# Patient Record
(demographics unavailable — no encounter records)

---

## 2024-12-05 NOTE — HISTORY OF PRESENT ILLNESS
[Patient reported bone density results were abnormal] : Patient reported bone density results were abnormal [HIV test declined] : HIV test declined [Syphilis test declined] : Syphilis test declined [Gonorrhea test declined] : Gonorrhea test declined [Chlamydia test declined] : Chlamydia test declined [Trichomonas test declined] : Trichomonas test declined [HPV test offered] : HPV test offered [Hepatitis B test declined] : Hepatitis B test declined [Hepatitis C test declined] : Hepatitis C test declined [postmenopausal] : postmenopausal [Y] : Positive pregnancy history [TextBox_4] : 68 yo  for well woman exam.  Sexually active.  Former smoker and occasional use of marijuana.  No hx abnormal pap smears, abnormal bleeding, fibroids, cysts.  No urinary complaints.  Past medical, surgical, social and family hx reviewed. Recently dx with osteoporosis - tried Prolia but got dry socket, has follow up appt to try new medication. Hx sleep apnea. MGM with ovarian cancer after menopause.  No other reproductive cancers in family [PapSmeardate] : 2/23 [BoneDensityDate] : 2024 [TextBox_37] : osteoporosis [ColonoscopyDate] : 2022 [PGHxTotal] : 2 [[facility] [[facility]] : 2 none

## 2024-12-05 NOTE — DISCUSSION/SUMMARY
[FreeTextEntry1] : Unremarkable CBE and SBE reviewed Mammogram/DEXA completed this year Pelvic exam unremarkable Pap/HPV collected I reviewed practices to support bone health including Vitamin D3 (2000 IU) and calcium rich foods with limitation on calcium supplementation by tabular form to 600 MG by mouth daily, a minimum of 30 minutes of weight bearing exercise at least 3 x weekly and limited daily sun exposure, 10-15 minutes.  Healthy diet, exercise and sleep hygiene discussed The importance of a screening colonoscopy was discussed and she is up to date.

## 2024-12-05 NOTE — HISTORY OF PRESENT ILLNESS
[Patient reported bone density results were abnormal] : Patient reported bone density results were abnormal [HIV test declined] : HIV test declined [Syphilis test declined] : Syphilis test declined [Gonorrhea test declined] : Gonorrhea test declined [Chlamydia test declined] : Chlamydia test declined [Trichomonas test declined] : Trichomonas test declined [HPV test offered] : HPV test offered [Hepatitis B test declined] : Hepatitis B test declined [Hepatitis C test declined] : Hepatitis C test declined [postmenopausal] : postmenopausal [Y] : Positive pregnancy history [TextBox_4] : 68 yo  for well woman exam.  Sexually active.  Former smoker and occasional use of marijuana.  No hx abnormal pap smears, abnormal bleeding, fibroids, cysts.  No urinary complaints.  Past medical, surgical, social and family hx reviewed. Recently dx with osteoporosis - tried Prolia but got dry socket, has follow up appt to try new medication. Hx sleep apnea. MGM with ovarian cancer after menopause.  No other reproductive cancers in family [PapSmeardate] : 2/23 [BoneDensityDate] : 2024 [TextBox_37] : osteoporosis [ColonoscopyDate] : 2022 [PGHxTotal] : 2 [[facility] [[facility] : [facility]